# Patient Record
Sex: FEMALE | Race: OTHER | Employment: OTHER | URBAN - METROPOLITAN AREA
[De-identification: names, ages, dates, MRNs, and addresses within clinical notes are randomized per-mention and may not be internally consistent; named-entity substitution may affect disease eponyms.]

---

## 2021-07-07 ENCOUNTER — APPOINTMENT (OUTPATIENT)
Dept: GENERAL RADIOLOGY | Facility: HOSPITAL | Age: 61
End: 2021-07-07
Attending: NURSE PRACTITIONER
Payer: COMMERCIAL

## 2021-07-07 ENCOUNTER — HOSPITAL ENCOUNTER (EMERGENCY)
Facility: HOSPITAL | Age: 61
Discharge: HOME | End: 2021-07-07
Payer: COMMERCIAL

## 2021-07-07 VITALS
WEIGHT: 156 LBS | HEART RATE: 75 BPM | OXYGEN SATURATION: 100 % | SYSTOLIC BLOOD PRESSURE: 143 MMHG | RESPIRATION RATE: 18 BRPM | TEMPERATURE: 98 F | DIASTOLIC BLOOD PRESSURE: 85 MMHG

## 2021-07-07 DIAGNOSIS — S61.212A LACERATION OF RIGHT MIDDLE FINGER WITHOUT FOREIGN BODY WITHOUT DAMAGE TO NAIL, INITIAL ENCOUNTER: Primary | ICD-10-CM

## 2021-07-07 DIAGNOSIS — S62.639B OPEN FRACTURE OF TUFT OF DISTAL PHALANX OF FINGER: ICD-10-CM

## 2021-07-07 PROCEDURE — 90471 IMMUNIZATION ADMIN: CPT

## 2021-07-07 PROCEDURE — 26750 TREAT FINGER FRACTURE EACH: CPT

## 2021-07-07 PROCEDURE — 73140 X-RAY EXAM OF FINGER(S): CPT | Performed by: NURSE PRACTITIONER

## 2021-07-07 PROCEDURE — 99284 EMERGENCY DEPT VISIT MOD MDM: CPT

## 2021-07-07 RX ORDER — CEFADROXIL 500 MG/1
500 CAPSULE ORAL 2 TIMES DAILY
Qty: 14 CAPSULE | Refills: 0 | Status: SHIPPED | OUTPATIENT
Start: 2021-07-07 | End: 2021-07-07

## 2021-07-07 RX ORDER — CEFADROXIL 500 MG/1
500 CAPSULE ORAL 2 TIMES DAILY
Qty: 14 CAPSULE | Refills: 0 | Status: SHIPPED | OUTPATIENT
Start: 2021-07-07 | End: 2021-07-14

## 2021-07-07 NOTE — ED PROVIDER NOTES
Patient Seen in: [facility] Emergency Department    History   CC: finger injury  HPI: Christian Cassidy 61year old female  who presents via EMS c/o right 3rd finger laceration s/p injury in which her finger got caught in a door.  No numbness/tingling steady gait  MSK - makes purposeful movements of all right hand digits with full ROM noted, finger flexion/extension strength equal bilat, radial pulses 2+ bilat.   Psych - Interactive and appropriate      ED Course   Labs Reviewed - No data to display    M warranting ER evaluation reviewed. She demonstrates understanding of all instruction and agrees with plan of care. A finger splint was applied. After application of the splint I returned and re-examined the patient.  The splint was adequately immobilizin